# Patient Record
Sex: MALE | Race: BLACK OR AFRICAN AMERICAN
[De-identification: names, ages, dates, MRNs, and addresses within clinical notes are randomized per-mention and may not be internally consistent; named-entity substitution may affect disease eponyms.]

---

## 2020-05-12 ENCOUNTER — HOSPITAL ENCOUNTER (EMERGENCY)
Dept: HOSPITAL 53 - M ED | Age: 20
Discharge: HOME | End: 2020-05-12
Payer: COMMERCIAL

## 2020-05-12 VITALS — HEIGHT: 68 IN | BODY MASS INDEX: 21.85 KG/M2 | WEIGHT: 144.18 LBS

## 2020-05-12 VITALS — SYSTOLIC BLOOD PRESSURE: 136 MMHG | DIASTOLIC BLOOD PRESSURE: 81 MMHG

## 2020-05-12 DIAGNOSIS — K21.9: Primary | ICD-10-CM

## 2020-05-12 NOTE — REP
CHEST, SINGLE VIEW:

 

There is no evidence of acute infiltrate.

 

No pleural effusion is seen.

 

The heart is normal in size.

 

The mediastinal silhouette is unremarkable.

 

The visualized osseous structures are intact.

 

IMPRESSION:

 

No acute pulmonary disease.

 

 

Electronically Signed by

Chance Blevins MD 05/12/2020 12:49 P

## 2020-05-13 NOTE — ECGEPIP
[facility] - ED

                                       

                                       Test Date:    2020

Pat Name:     OCHOA BARKSDALE         Department:   

Patient ID:   [id number]                 Room:         -

Gender:       Male                     Technician:   CT

:          2000               Requested By: IGNACIO DAVIS PA-C.

Order Number: [id number]     Reading MD:   Don Dennison

                                 Measurements

Intervals                              Axis          

Rate:         64                       P:            56

FL:           152                      QRS:          56

QRSD:         89                       T:            46

QT:           353                                    

QTc:          364                                    

                           Interpretive Statements

SINUS RHYTHM

LEFT VENTRICULAR HYPERTROPHY BY VOLTAGE CRITERIA

BENIGN EARLY REPOLARIZATION

NO PRIORS FOR COMPARISON

Electronically Signed on 2020 8:28:22 EDT by Don Dennison

## 2020-08-03 ENCOUNTER — HOSPITAL ENCOUNTER (EMERGENCY)
Dept: HOSPITAL 53 - M ED | Age: 20
Discharge: HOME | End: 2020-08-03
Payer: COMMERCIAL

## 2020-08-03 DIAGNOSIS — R51: Primary | ICD-10-CM

## 2020-08-03 PROCEDURE — 96374 THER/PROPH/DIAG INJ IV PUSH: CPT

## 2020-08-03 PROCEDURE — 96375 TX/PRO/DX INJ NEW DRUG ADDON: CPT

## 2020-08-03 PROCEDURE — 83735 ASSAY OF MAGNESIUM: CPT

## 2020-08-03 PROCEDURE — 70450 CT HEAD/BRAIN W/O DYE: CPT

## 2020-08-03 PROCEDURE — 85027 COMPLETE CBC AUTOMATED: CPT

## 2020-08-03 PROCEDURE — 96361 HYDRATE IV INFUSION ADD-ON: CPT

## 2020-08-03 PROCEDURE — 99283 EMERGENCY DEPT VISIT LOW MDM: CPT

## 2020-08-03 PROCEDURE — 80048 BASIC METABOLIC PNL TOTAL CA: CPT

## 2020-08-30 LAB
HCT VFR BLD AUTO: 43 % (ref 42–52)
HGB BLD-MCNC: 15.4 G/DL (ref 13.5–17.5)
MCH RBC QN AUTO: 31.1 PG (ref 27–33)
MCHC RBC AUTO-ENTMCNC: 35.8 G/DL (ref 32–36.5)
MCV RBC AUTO: 86.9 FL (ref 80–96)
PLATELET # BLD AUTO: 216 10^3/UL (ref 150–450)
RBC # BLD AUTO: 4.95 10^6/UL (ref 4.3–6.1)
WBC # BLD AUTO: 5.3 10^3/UL (ref 4–10)

## 2020-09-22 LAB
BUN SERPL-MCNC: 10 MG/DL (ref 7–18)
CALCIUM SERPL-MCNC: 9.8 MG/DL (ref 8.5–10.1)
CHLORIDE SERPL-SCNC: 104 MEQ/L (ref 98–107)
CO2 SERPL-SCNC: 30 MEQ/L (ref 21–32)
CREAT SERPL-MCNC: 1.05 MG/DL (ref 0.7–1.3)
GLUCOSE SERPL-MCNC: 81 MG/DL (ref 70–100)
MAGNESIUM SERPL-MCNC: 2.1 MG/DL (ref 1.4–2)
POTASSIUM SERPL-SCNC: 4 MEQ/L (ref 3.5–5.1)
SODIUM SERPL-SCNC: 138 MEQ/L (ref 136–145)